# Patient Record
Sex: FEMALE | Race: BLACK OR AFRICAN AMERICAN | Employment: UNEMPLOYED | URBAN - METROPOLITAN AREA
[De-identification: names, ages, dates, MRNs, and addresses within clinical notes are randomized per-mention and may not be internally consistent; named-entity substitution may affect disease eponyms.]

---

## 2022-12-28 ENCOUNTER — HOSPITAL ENCOUNTER (EMERGENCY)
Age: 57
Discharge: HOME | End: 2022-12-28
Attending: EMERGENCY MEDICINE

## 2022-12-28 VITALS
DIASTOLIC BLOOD PRESSURE: 58 MMHG | WEIGHT: 117 LBS | BODY MASS INDEX: 22.97 KG/M2 | HEIGHT: 60 IN | RESPIRATION RATE: 16 BRPM | HEART RATE: 78 BPM | TEMPERATURE: 98 F | SYSTOLIC BLOOD PRESSURE: 114 MMHG | OXYGEN SATURATION: 99 %

## 2022-12-28 DIAGNOSIS — L98.9 SKIN LESIONS: Primary | ICD-10-CM

## 2022-12-28 DIAGNOSIS — T14.8XXA SKIN EXCORIATION: ICD-10-CM

## 2022-12-28 DIAGNOSIS — L03.119 CELLULITIS OF UPPER EXTREMITY, UNSPECIFIED LATERALITY: ICD-10-CM

## 2022-12-28 PROCEDURE — 99283 EMERGENCY DEPT VISIT LOW MDM: CPT

## 2022-12-28 PROCEDURE — 74011250637 HC RX REV CODE- 250/637: Performed by: EMERGENCY MEDICINE

## 2022-12-28 RX ORDER — SULFAMETHOXAZOLE AND TRIMETHOPRIM 800; 160 MG/1; MG/1
2 TABLET ORAL
Status: COMPLETED | OUTPATIENT
Start: 2022-12-28 | End: 2022-12-28

## 2022-12-28 RX ORDER — SULFAMETHOXAZOLE AND TRIMETHOPRIM 800; 160 MG/1; MG/1
2 TABLET ORAL 2 TIMES DAILY
Qty: 40 TABLET | Refills: 0 | Status: SHIPPED | OUTPATIENT
Start: 2022-12-28

## 2022-12-28 RX ADMIN — SULFAMETHOXAZOLE AND TRIMETHOPRIM 2 TABLET: 800; 160 TABLET ORAL at 19:57

## 2022-12-29 NOTE — ED NOTES
Discharge instructions were given to the patient by Anuj Moore RN  . The patient left the Emergency Department ambulatory, alert and oriented and in no acute distress with 1 prescriptions. The patient was encouraged to call or return to the ED for worsening issues or problems and was encouraged to schedule a follow up appointment for continuing care. The patient verbalized understanding of discharge instructions and prescriptions, all questions were answered. The patient has no further concerns at this time.       Pt to call CM tomorrow
Dental

## 2022-12-29 NOTE — ED PROVIDER NOTES
68-year-old female presents via EMS with chief complaint of possible bites on bilateral forearms. Denies lesions elsewhere on extremities or torso. Also reports lesions on the right side of her face. Patient states that she was discharged from [address] [place] [address] a few days ago and has been staying with some acquaintances since then. She believes that she has been exposed to something while she sleeps that causes sores. The facial lesions are where she had her head on the pillow she states. Lesions began yesterday. Denies fever or systemic symptoms. Specifically denies nausea, vomiting, dizziness, weakness. States that there is increasing redness around the lesions and some of them have drained some purulence. Has multiple social concerns. States [facility] was trying to set her up with help with getting prescriptions as well as psychiatric care and housing. She asked EMS to go to [address] [place] [address] but was brought here instead. No past medical history on file. No past surgical history on file. No family history on file. Social History     Socioeconomic History    Marital status: Not on file     Spouse name: Not on file    Number of children: Not on file    Years of education: Not on file    Highest education level: Not on file   Occupational History    Not on file   Tobacco Use    Smoking status: Not on file    Smokeless tobacco: Not on file   Substance and Sexual Activity    Alcohol use: Not on file    Drug use: Not on file    Sexual activity: Not on file   Other Topics Concern    Not on file   Social History Narrative    Not on file     Social Determinants of Health     Financial Resource Strain: Not on file   Food Insecurity: Not on file   Transportation Needs: Not on file   Physical Activity: Not on file   Stress: Not on file   Social Connections: Not on file   Intimate Partner Violence: Not on file   Housing Stability: Not on file         ALLERGIES: Patient has no known allergies.     Review of Systems   Constitutional: Negative. Negative for chills, fever and unexpected weight change. HENT: Negative. Negative for congestion and trouble swallowing. Eyes:  Negative for discharge. Respiratory: Negative. Negative for cough, chest tightness and shortness of breath. Cardiovascular: Negative. Negative for chest pain. Gastrointestinal: Negative. Negative for abdominal distention, abdominal pain, constipation, diarrhea, nausea and vomiting. Endocrine: Negative. Genitourinary: Negative. Negative for difficulty urinating, dysuria, frequency and urgency. Musculoskeletal: Negative. Negative for arthralgias and myalgias. Skin:  Positive for color change and wound. Allergic/Immunologic: Negative. Neurological: Negative. Negative for dizziness, speech difficulty, weakness and headaches. Hematological: Negative. Psychiatric/Behavioral: Negative. Negative for agitation and confusion. All other systems reviewed and are negative. Vitals:    12/28/22 1852   BP: (!) 114/58   Pulse: 78   Resp: 16   Temp: 98 °F (36.7 °C)   SpO2: 99%   Weight: 53.1 kg (117 lb)   Height: 5' (1.524 m)            Physical Exam  Vitals and nursing note reviewed. Constitutional:       Appearance: She is well-developed. HENT:      Head: Normocephalic and atraumatic. Eyes:      Conjunctiva/sclera: Conjunctivae normal.   Cardiovascular:      Rate and Rhythm: Normal rate and regular rhythm. Pulmonary:      Effort: Pulmonary effort is normal. No respiratory distress. Abdominal:      Palpations: Abdomen is soft. Tenderness: There is no abdominal tenderness. Musculoskeletal:         General: No deformity. Normal range of motion. Cervical back: Neck supple. Skin:     General: Skin is warm and dry. Comments: Appears to have 2 healing abrasions to right face. No erythema, fluctuance, purulence, edema.   Patient has several excoriations to bilateral hands on dorsal surfaces of bilateral forearms, also dorsal surfaces. The lesions on her hands have significant associated erythema. No purulence or drainage. Forearm lesions have intact eschar. Hand lesions have ulcerations consistent with patient having scratched at eschar. Neurological:      Mental Status: She is alert and oriented to person, place, and time. Psychiatric:         Behavior: Behavior normal.         Thought Content: Thought content normal.        MDM         Procedures    LABORATORY TESTS:  No results found for this or any previous visit (from the past 12 hour(s)). IMAGING RESULTS:  No orders to display       MEDICATIONS GIVEN:  Medications   trimethoprim-sulfamethoxazole (BACTRIM DS, SEPTRA DS) 160-800 mg per tablet 2 Tablet (2 Tablets Oral Given 12/28/22 1957)       IMPRESSION:  1. Skin lesions    2. Cellulitis of upper extremity, unspecified laterality    3. Skin excoriation        PLAN:  1. Discharge Medication List as of 12/28/2022  7:48 PM        START taking these medications    Details   trimethoprim-sulfamethoxazole (Bactrim DS) 160-800 mg per tablet Take 2 Tablets by mouth two (2) times a day., Print, Disp-40 Tablet, R-0           2.    Follow-up Information       Follow up With Specialties Details Why [id number]  Schedule an appointment as soon as possible for a visit   [address] [facility] [phone]    [address] [facility]  Schedule an appointment as soon as possible for a visit   [address] [address]  [phone]          Return to ED if worse